# Patient Record
Sex: FEMALE
[De-identification: names, ages, dates, MRNs, and addresses within clinical notes are randomized per-mention and may not be internally consistent; named-entity substitution may affect disease eponyms.]

---

## 2018-07-03 ENCOUNTER — HOSPITAL ENCOUNTER (EMERGENCY)
Dept: HOSPITAL 31 - C.ER | Age: 18
Discharge: HOME | End: 2018-07-03
Payer: SELF-PAY

## 2018-07-03 VITALS
HEART RATE: 68 BPM | OXYGEN SATURATION: 98 % | SYSTOLIC BLOOD PRESSURE: 110 MMHG | DIASTOLIC BLOOD PRESSURE: 73 MMHG | TEMPERATURE: 98.2 F | RESPIRATION RATE: 18 BRPM
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
## 2018-07-03 DIAGNOSIS — S61.012A: Primary | ICD-10-CM

## 2018-07-03 NOTE — C.PDOC
History Of Present Illness


18 year old female presents to the ED for evaluation of a left thumb laceration 

which she sustained two days ago. Patient states the wound opened up and 

started bleeding today when she tried to lift a window. Patient denies any 

other injuries at this time. 


Time Seen by Provider: 07/03/18 16:44


Chief Complaint (Nursing): Abnormal Skin Integrity


History Per: Patient


History/Exam Limitations: no limitations


Onset/Duration Of Symptoms: Hrs


Current Symptoms Are (Timing): Still Present


Location Of Injury: Left: Hand (thumb)


Additional History Per: Patient





Past Medical History


Reviewed: Historical Data, Nursing Documentation, Vital Signs


Vital Signs: 


 Last Vital Signs











Temp  98.2 F   07/03/18 17:33


 


Pulse  68   07/03/18 17:33


 


Resp  18   07/03/18 17:58


 


BP  110/73   07/03/18 17:33


 


Pulse Ox  98   07/03/18 17:33














- Medical History


PMH: No Chronic Diseases


Surgical History: No Surg Hx


Family History: States: Unknown Family Hx





- Social History


Hx Alcohol Use: No


Hx Substance Use: No





- Immunization History


Hx Tetanus Toxoid Vaccination: No


Hx Influenza Vaccination: No


Hx Pneumococcal Vaccination: No





Review Of Systems


Skin: Positive for: Other (laceration to left thumb )





Physical Exam





- Physical Exam


Appears: Non-toxic, No Acute Distress


Skin: Warm, Dry, Other (1cm laceration to left thumb. no active bleeding)


Head: Atraumatic, Normacephalic


Eye(s): bilateral: Normal Inspection


Oral Mucosa: Moist


Neck: Supple


Extremity: Normal ROM, Capillary Refill (less than 2 seconds )


Neurological/Psych: Oriented x3, Normal Speech, Normal Cognition, Normal 

Sensation





Laceration





- Laceration Repair


  ** left thumb


Wound Length (In cm): 1


Description Of Wound: Linear


Anesthesia: Lidocaine 1%


Wound Examination: Irrigated With Saline, No FB With Wound Exploration, No 

Tendon Injury With Wound Exploration


Wound Closure: Suture (two )


Wound Complexity: Simple





Medical Decision Making


Medical Decision Making: 





Impression: 18 year old female with left thumb laceration 


Progress: 


1cm laceration to left thumb. Local anesthesia achieved with 1% lidocaine 

without epinephrine. Wound irrigated with normal saline and explored. No FB 

seen. No tendon injury. Two sutures placed. Hemostasis achieved, patient 

tolerated well.





Patient is resting comfortably, showing no signs of distress and is stable for 

discharge. Will prescribe prophylactic antibiotic treatment since laceration 

was sustained two days ago. Advised to follow up with PMD within 1-2 days for 

further evaluation and/or return to the ED if symptoms persist or worsen. 





Disposition





- Disposition


Disposition: HOME/ ROUTINE


Disposition Time: 05:00


Condition: STABLE


Additional Instructions: 


follow up with your doctor/clinic. return to er with worsneing symptoms or 

concenrs. sutures need to be removed in 10 days. 


Prescriptions: 


Sulfamethoxazole/Trimethoprim [Bactrim  mg-160 mg] 1 tab PO BID #20 tab


Instructions:  Laceration Repair, Laceration Repair With Stitches (DC)


Forms:  [email or web address] (English)


Print Language: Cymraes





- Clinical Impression


Clinical Impression: 


 Thumb laceration








- Scribe Statement


The provider has reviewed the documentation as recorded by the Scribe (Ashley Dawson)


Provider Attestation: 








All medical record entries made by the Scribe were at my direction and 

personally dictated by me. I have reviewed the chart and agree that the record 

accurately reflects my personal performance of the history, physical exam, 

medical decision making, and the department course for this patient. I have 

also personally directed, reviewed, and agree with the discharge instructions 

and disposition.

## 2018-10-29 ENCOUNTER — HOSPITAL ENCOUNTER (EMERGENCY)
Dept: HOSPITAL 31 - C.ER | Age: 18
Discharge: HOME | End: 2018-10-29
Payer: COMMERCIAL

## 2018-10-29 VITALS — RESPIRATION RATE: 14 BRPM

## 2018-10-29 VITALS — SYSTOLIC BLOOD PRESSURE: 120 MMHG | TEMPERATURE: 98.5 F | DIASTOLIC BLOOD PRESSURE: 70 MMHG | HEART RATE: 70 BPM

## 2018-10-29 VITALS — OXYGEN SATURATION: 100 %

## 2018-10-29 DIAGNOSIS — J39.2: Primary | ICD-10-CM

## 2018-10-29 NOTE — C.PDOC
History Of Present Illness


18 year old female presents to the ER stating that since 08/2018 she has felt a 

lump in her throat when she swallows. Patient states she does not feel any pain 

but does have some discomfort when eating. Denies fever or URI symptoms. She has

not seen a doctor for the symptoms.


Time Seen by Provider: 10/29/18 19:29


Chief Complaint (Nursing): ENT Problem


History Per: Patient


History/Exam Limitations: None


Onset/Duration Of Symptoms: Days


Current Symptoms Are (Timing): Still Present


Quality (Mouth/Throat): Other (Feels lump when swallowing)


Anticoagulant/Antiplatlet Use?: No





Past Medical History


Reviewed: Historical Data, Nursing Documentation, Vital Signs


Vital Signs: 





                                Last Vital Signs











Temp  98.9 F   10/29/18 18:55


 


Pulse  87   10/29/18 18:55


 


Resp  14   10/29/18 18:55


 


BP  130/90   10/29/18 18:55


 


Pulse Ox  100   10/29/18 18:55











Family History: States: Unknown Family Hx





- Social History


Hx Alcohol Use: No


Hx Substance Use: No





- Immunization History


Hx Tetanus Toxoid Vaccination: No


Hx Influenza Vaccination: No


Hx Pneumococcal Vaccination: No





Review Of Systems


Constitutional: Negative for: Fever


ENT: Positive for: Other (Lump in throat when swallowing).  Negative for: Nose 

Discharge, Throat Pain


Respiratory: Negative for: Cough





Physical Exam





- Physical Exam


Appears: Non-toxic


Skin: Normal Color, Warm, Dry


Head: Atraumatic, Normacephalic


Eye(s): bilateral: Normal Inspection


Ear(s): Bilateral: Normal


Nose: Normal


Oral Mucosa: Moist


Throat: Normal, No Erythema, No Mass, Other (Uvula midline)


Neck: Normal, Supple, Other (Normal thyroid, no palpable mass)


Neurological/Psych: Oriented x3, Normal Speech





ED Course And Treatment


O2 Sat by Pulse Oximetry: 100 (Room air)


Pulse Ox Interpretation: Normal


Progress Note: Patient is resting comfortably in the ER in no acute distress, 

vitals are stable, will discharge home with instructions to follow up at the 

clinic for further evaluation.





Disposition


Counseled Patient/Family Regarding: Diagnosis, Need For Followup





- Disposition


Referrals: 


[facility] [Outside]


Disposition: HOME/ ROUTINE


Disposition Time: 20:09


Condition: STABLE


Additional Instructions: 


Sigue en la clinica





Regresa si se empeora


Instructions:  Sore Throat, Adult (DC)


Forms:  [employer] (English)


Print Language: Kyrgyz





- Clinical Impression


Clinical Impression: 


 Throat discomfort








- PA / NP / Resident Statement


MD/DO has reviewed & agrees with the documentation as recorded.





- Scribe Statement


The provider has reviewed the documentation as recorded by the Scribgama Walker





All medical record entries made by the Hernandezibe were at my direction and 

personally dictated by me. I have reviewed the chart and agree that the record 

accurately reflects my personal performance of the history, physical exam, 

medical decision making, and the department course for this patient. I have also

 personally directed, reviewed, and agree with the discharge instructions and 

disposition.